# Patient Record
Sex: FEMALE | Race: WHITE
[De-identification: names, ages, dates, MRNs, and addresses within clinical notes are randomized per-mention and may not be internally consistent; named-entity substitution may affect disease eponyms.]

---

## 2019-04-24 ENCOUNTER — HOSPITAL ENCOUNTER (EMERGENCY)
Dept: HOSPITAL 36 - ER | Age: 74
Discharge: HOME | End: 2019-04-24
Payer: MEDICARE

## 2019-04-24 DIAGNOSIS — I10: ICD-10-CM

## 2019-04-24 DIAGNOSIS — E07.9: ICD-10-CM

## 2019-04-24 DIAGNOSIS — Z88.0: ICD-10-CM

## 2019-04-24 DIAGNOSIS — H11.31: Primary | ICD-10-CM

## 2019-04-24 PROCEDURE — Z7502: HCPCS

## 2019-04-24 NOTE — ED PHYSICIAN CHART
ED Chief Complaint/HPI





- Patient Information


Date Seen:: 04/24/19


Time Seen:: 10:16


Chief Complaint:: Right eye bleed


History of Present Illness:: 


74 yo female felt sudden onset of right pressure and noticed bleed in the right 

eye. Pt stated minor pain but mostly pressure and sense of foreign body in the 

right eye. Pt reported wake up at 3am and stay awake until 5am, possibly due to 

jet lag as pt works in [place] Monday to Thursday, and fly to [place] at weekends. Pt 

had similar episode 7 years ago. 


Vitals:: 





 Vital Signs (72 hours)











  04/24/19 04/24/19 04/24/19





  10:00 10:59 11:09


 


Temp 97.4 F 97.4 F 


 


HR 58 62 62


 


RR 18 16 


 


/89 163/56 163/56


 


O2 Sat % 97  














ED Review of Systems





- Review of Systems


General/Constitutional: No fever, No chills


Skin: No rash


Head: No headache


Eyes: No loss of vision


ENT: No nasal drainage


Neck: No neck pain, No thyromegaly


Cardio Vascular: No chest pain


Pulmonary: No SOB


GI: No nausea, No vomiting


Musculoskeletal: No bone or joint pain


Psychiatric: No prior psych history


Neurological: No focal symptoms





ED Past Medical History





- Past Medical History


Past Medical History: Thyroid disorder (Hypothyroidism), Other (Right retinal 

hole treated with laser)


Social History: Non Smoker, Alcohol, No Drug Use





Family Medical History





- Family Member


  ** Mother


History Unknown: Yes





ED Physical Exam





- Physical Examination


General/Constitutional: Awake, Alert


Head: Atraumatic


Eyes: PERRL, EOMI


Other Eyes comments:: 


Right eye subconjunctival hemorrhage.


Skin: No ecchymosis


ENMT: Nasal exam nl


Neck: No nuchal rigidity


Respiratory: Clear to Auscultation


Cardio Vascular: RRR, No murmur, gallop, rubs, NL S1 S2


GI: No tenderness/rebounding/guarding


Extremities: normal strength in all extremities


Neuro/Psych: No focal deficits





ED Assessment





- Assessment


General Assessment: 


Subconjunctival hemorrhage, right eye


Hypertension





Assessment/Comments:: 


Hydralazine 25mg PO x 1


Advised patient that the subconjunctival hemorrhage may resolve in 2 weeks 


Apply artificial tears


D/c home


Amlodipine 2.5mg qd #30


F/u PMD








ED Septic Shock





- .


Is Septic Shock (SBP<90, OR Lactate>4 mmol\L) present?: No





ED Reassessment (Disposition)





- Reassessment


Reassessment Condition:: Improved





- Aftercare/Follow up Instructions


Medication Prescribed:: 


Amlodipine 2.5mg PO qd, #30





- Patient Disposition


Discharge/Transfer:: Home